# Patient Record
Sex: MALE
[De-identification: names, ages, dates, MRNs, and addresses within clinical notes are randomized per-mention and may not be internally consistent; named-entity substitution may affect disease eponyms.]

---

## 2020-05-05 ENCOUNTER — TELEPHONE (OUTPATIENT)
Dept: SCHEDULING | Age: 70
End: 2020-05-05

## 2020-05-07 RX ORDER — FUROSEMIDE 20 MG/1
TABLET ORAL
Qty: 90 TABLET | Refills: 0 | OUTPATIENT
Start: 2020-05-07

## 2020-10-25 ENCOUNTER — NURSE ONLY (OUTPATIENT)
Dept: LAB | Age: 70
End: 2020-10-25
Attending: NURSE PRACTITIONER
Payer: MEDICARE

## 2020-10-25 DIAGNOSIS — C71.9 BRAIN CANCER (HCC): ICD-10-CM

## 2020-10-25 DIAGNOSIS — R56.9 SEIZURES (HCC): ICD-10-CM

## 2020-10-25 DIAGNOSIS — R41.9: Primary | ICD-10-CM

## 2020-10-25 PROCEDURE — 85025 COMPLETE CBC W/AUTO DIFF WBC: CPT

## 2020-10-25 PROCEDURE — 80053 COMPREHEN METABOLIC PANEL: CPT

## 2020-10-25 PROCEDURE — 80177 DRUG SCRN QUAN LEVETIRACETAM: CPT

## 2020-10-25 PROCEDURE — 36415 COLL VENOUS BLD VENIPUNCTURE: CPT

## 2020-10-28 ENCOUNTER — NURSE ONLY (OUTPATIENT)
Dept: LAB | Age: 70
End: 2020-10-28
Attending: INTERNAL MEDICINE
Payer: MEDICARE

## 2020-10-28 DIAGNOSIS — J96.01 ACUTE RESPIRATORY FAILURE WITH HYPOXIA (HCC): Primary | ICD-10-CM

## 2020-10-28 PROCEDURE — 36415 COLL VENOUS BLD VENIPUNCTURE: CPT

## 2020-10-28 PROCEDURE — 85025 COMPLETE CBC W/AUTO DIFF WBC: CPT

## 2020-10-28 PROCEDURE — 80053 COMPREHEN METABOLIC PANEL: CPT

## 2020-11-02 ENCOUNTER — NURSE ONLY (OUTPATIENT)
Dept: LAB | Age: 70
End: 2020-11-02
Attending: INTERNAL MEDICINE
Payer: MEDICARE

## 2020-11-02 DIAGNOSIS — R52 PAIN: Primary | ICD-10-CM

## 2020-11-02 PROCEDURE — 85025 COMPLETE CBC W/AUTO DIFF WBC: CPT

## 2020-11-02 PROCEDURE — 36415 COLL VENOUS BLD VENIPUNCTURE: CPT

## 2020-11-02 PROCEDURE — 80053 COMPREHEN METABOLIC PANEL: CPT

## 2020-11-02 PROCEDURE — 82150 ASSAY OF AMYLASE: CPT

## 2020-11-02 PROCEDURE — 83690 ASSAY OF LIPASE: CPT

## 2020-11-04 ENCOUNTER — NURSE ONLY (OUTPATIENT)
Dept: LAB | Age: 70
End: 2020-11-04
Attending: INTERNAL MEDICINE
Payer: MEDICARE

## 2020-11-04 DIAGNOSIS — G93.40 ENCEPHALOPATHY, UNSPECIFIED: Primary | ICD-10-CM

## 2020-11-04 PROCEDURE — 36415 COLL VENOUS BLD VENIPUNCTURE: CPT

## 2020-11-04 PROCEDURE — 85025 COMPLETE CBC W/AUTO DIFF WBC: CPT

## 2020-11-04 PROCEDURE — 80053 COMPREHEN METABOLIC PANEL: CPT

## 2020-11-08 ENCOUNTER — HOSPITAL ENCOUNTER (INPATIENT)
Facility: HOSPITAL | Age: 70
LOS: 6 days | Discharge: HOSPICE | DRG: 698 | End: 2020-11-14
Attending: EMERGENCY MEDICINE | Admitting: INTERNAL MEDICINE
Payer: MEDICARE

## 2020-11-08 ENCOUNTER — APPOINTMENT (OUTPATIENT)
Dept: CT IMAGING | Facility: HOSPITAL | Age: 70
DRG: 698 | End: 2020-11-08
Attending: EMERGENCY MEDICINE
Payer: MEDICARE

## 2020-11-08 ENCOUNTER — APPOINTMENT (OUTPATIENT)
Dept: GENERAL RADIOLOGY | Facility: HOSPITAL | Age: 70
DRG: 698 | End: 2020-11-08
Attending: EMERGENCY MEDICINE
Payer: MEDICARE

## 2020-11-08 DIAGNOSIS — M86.9 OSTEOMYELITIS, UNSPECIFIED SITE, UNSPECIFIED TYPE (HCC): ICD-10-CM

## 2020-11-08 DIAGNOSIS — L89.154 DECUBITUS ULCER OF SACRAL REGION, STAGE 4 (HCC): Primary | ICD-10-CM
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
## 2020-11-08 PROCEDURE — 70450 CT HEAD/BRAIN W/O DYE: CPT | Performed by: EMERGENCY MEDICINE

## 2020-11-08 PROCEDURE — 99223 1ST HOSP IP/OBS HIGH 75: CPT | Performed by: INTERNAL MEDICINE

## 2020-11-08 PROCEDURE — 72125 CT NECK SPINE W/O DYE: CPT | Performed by: EMERGENCY MEDICINE

## 2020-11-08 PROCEDURE — 71045 X-RAY EXAM CHEST 1 VIEW: CPT | Performed by: EMERGENCY MEDICINE

## 2020-11-08 PROCEDURE — 71275 CT ANGIOGRAPHY CHEST: CPT | Performed by: EMERGENCY MEDICINE

## 2020-11-08 PROCEDURE — 74177 CT ABD & PELVIS W/CONTRAST: CPT | Performed by: EMERGENCY MEDICINE

## 2020-11-08 RX ORDER — LEVETIRACETAM 500 MG/1
1500 TABLET ORAL 2 TIMES DAILY
Status: DISCONTINUED | OUTPATIENT
Start: 2020-11-08 | End: 2020-11-14

## 2020-11-08 RX ORDER — CLOBAZAM 10 MG/1
5 TABLET ORAL NIGHTLY
Status: DISCONTINUED | OUTPATIENT
Start: 2020-11-08 | End: 2020-11-14

## 2020-11-08 RX ORDER — TERAZOSIN 2 MG/1
2 CAPSULE ORAL NIGHTLY
Status: DISCONTINUED | OUTPATIENT
Start: 2020-11-08 | End: 2020-11-14

## 2020-11-08 RX ORDER — HYDRALAZINE HYDROCHLORIDE 10 MG/1
10 TABLET, FILM COATED ORAL 4 TIMES DAILY
COMMUNITY

## 2020-11-08 RX ORDER — ONDANSETRON 4 MG/1
4 TABLET, FILM COATED ORAL EVERY 8 HOURS PRN
COMMUNITY

## 2020-11-08 RX ORDER — LEVETIRACETAM 1000 MG/1
1500 TABLET ORAL 2 TIMES DAILY
COMMUNITY

## 2020-11-08 RX ORDER — MORPHINE SULFATE 2 MG/ML
2 INJECTION, SOLUTION INTRAMUSCULAR; INTRAVENOUS EVERY 2 HOUR PRN
Status: DISCONTINUED | OUTPATIENT
Start: 2020-11-08 | End: 2020-11-14

## 2020-11-08 RX ORDER — VANCOMYCIN HYDROCHLORIDE
15 EVERY 12 HOURS
Status: DISCONTINUED | OUTPATIENT
Start: 2020-11-09 | End: 2020-11-14

## 2020-11-08 RX ORDER — MORPHINE SULFATE 2 MG/ML
2 INJECTION, SOLUTION INTRAMUSCULAR; INTRAVENOUS EVERY 30 MIN PRN
Status: DISCONTINUED | OUTPATIENT
Start: 2020-11-08 | End: 2020-11-14

## 2020-11-08 RX ORDER — DEXTROSE MONOHYDRATE 25 G/50ML
50 INJECTION, SOLUTION INTRAVENOUS
Status: DISCONTINUED | OUTPATIENT
Start: 2020-11-08 | End: 2020-11-14

## 2020-11-08 RX ORDER — LORAZEPAM 2 MG/ML
0.5 INJECTION INTRAMUSCULAR
Status: DISCONTINUED | OUTPATIENT
Start: 2020-11-08 | End: 2020-11-14

## 2020-11-08 RX ORDER — IPRATROPIUM BROMIDE 21 UG/1
2 SPRAY, METERED NASAL EVERY 12 HOURS
COMMUNITY

## 2020-11-08 RX ORDER — METHION/INOS/CHOL BT/B COM/LIV 110MG-86MG
100 CAPSULE ORAL DAILY
COMMUNITY

## 2020-11-08 RX ORDER — VANCOMYCIN 2 GRAM/500 ML IN 0.9 % SODIUM CHLORIDE INTRAVENOUS
25 ONCE
Status: COMPLETED | OUTPATIENT
Start: 2020-11-08 | End: 2020-11-09

## 2020-11-08 RX ORDER — ASPIRIN 325 MG
325 TABLET ORAL DAILY
Status: DISCONTINUED | OUTPATIENT
Start: 2020-11-08 | End: 2020-11-14

## 2020-11-08 RX ORDER — MORPHINE SULFATE 2 MG/ML
2 INJECTION, SOLUTION INTRAMUSCULAR; INTRAVENOUS ONCE
Status: COMPLETED | OUTPATIENT
Start: 2020-11-08 | End: 2020-11-08

## 2020-11-08 RX ORDER — ONDANSETRON 2 MG/ML
4 INJECTION INTRAMUSCULAR; INTRAVENOUS ONCE
Status: COMPLETED | OUTPATIENT
Start: 2020-11-08 | End: 2020-11-08

## 2020-11-08 RX ORDER — HYDRALAZINE HYDROCHLORIDE 10 MG/1
10 TABLET, FILM COATED ORAL 4 TIMES DAILY
Status: DISCONTINUED | OUTPATIENT
Start: 2020-11-08 | End: 2020-11-09

## 2020-11-08 RX ORDER — CLOBAZAM 10 MG/1
5 TABLET ORAL NIGHTLY
COMMUNITY

## 2020-11-08 RX ORDER — ACETAMINOPHEN 325 MG/1
650 TABLET ORAL EVERY 6 HOURS PRN
Status: DISCONTINUED | OUTPATIENT
Start: 2020-11-08 | End: 2020-11-09

## 2020-11-08 RX ORDER — MAGNESIUM HYDROXIDE/ALUMINUM HYDROXICE/SIMETHICONE 120; 1200; 1200 MG/30ML; MG/30ML; MG/30ML
15 SUSPENSION ORAL EVERY 6 HOURS PRN
COMMUNITY

## 2020-11-08 RX ORDER — ENOXAPARIN SODIUM 100 MG/ML
40 INJECTION SUBCUTANEOUS DAILY
COMMUNITY

## 2020-11-08 RX ORDER — ASPIRIN 81 MG/1
325 TABLET, CHEWABLE ORAL DAILY
COMMUNITY

## 2020-11-08 RX ORDER — HYDROCODONE BITARTRATE AND ACETAMINOPHEN 5; 325 MG/1; MG/1
1 TABLET ORAL EVERY 12 HOURS PRN
COMMUNITY

## 2020-11-08 RX ORDER — SODIUM CHLORIDE 9 MG/ML
INJECTION, SOLUTION INTRAVENOUS CONTINUOUS
Status: ACTIVE | OUTPATIENT
Start: 2020-11-08 | End: 2020-11-08

## 2020-11-08 RX ORDER — DEXTROSE AND SODIUM CHLORIDE 5; .9 G/100ML; G/100ML
INJECTION, SOLUTION INTRAVENOUS CONTINUOUS
Status: DISCONTINUED | OUTPATIENT
Start: 2020-11-08 | End: 2020-11-14

## 2020-11-08 RX ORDER — ALBUTEROL SULFATE 2.5 MG/3ML
2.5 SOLUTION RESPIRATORY (INHALATION) EVERY 6 HOURS PRN
COMMUNITY

## 2020-11-08 RX ORDER — ENOXAPARIN SODIUM 100 MG/ML
40 INJECTION SUBCUTANEOUS NIGHTLY
Status: DISCONTINUED | OUTPATIENT
Start: 2020-11-08 | End: 2020-11-14

## 2020-11-08 RX ORDER — ACETAMINOPHEN 325 MG/1
650 TABLET ORAL EVERY 6 HOURS PRN
COMMUNITY

## 2020-11-08 RX ORDER — METRONIDAZOLE 500 MG/100ML
500 INJECTION, SOLUTION INTRAVENOUS ONCE
Status: COMPLETED | OUTPATIENT
Start: 2020-11-08 | End: 2020-11-08

## 2020-11-08 RX ORDER — LORAZEPAM 2 MG/ML
INJECTION INTRAMUSCULAR EVERY 10 MIN PRN
Status: DISCONTINUED | OUTPATIENT
Start: 2020-11-08 | End: 2020-11-14

## 2020-11-08 RX ORDER — DOXAZOSIN 2 MG/1
2 TABLET ORAL NIGHTLY
COMMUNITY

## 2020-11-08 NOTE — ED INITIAL ASSESSMENT (HPI)
Pt found face down at nursing home after unwitnessed fall. Bruise to forehead and bridge of nose. Pt is non verbal,  Mental status is his norm.

## 2020-11-08 NOTE — H&P
[facility]ITAL[facility]  History and Physical     Dalia Daquanin Patient Status:  Emergency    1950 MRN [id number]   Location [facility] Attending Aleyda Jarvis MD   Hosp Day # 0 PCP No primary care provider on file.      Katya mg by mouth nightly., Disp: , Rfl:     •  Enoxaparin Sodium 40 MG/0.4ML Subcutaneous Solution, Inject 40 mg into the skin daily. , Disp: , Rfl:     •  hydrALAzine HCl 10 MG Oral Tab, Take 10 mg by mouth 4 (four) times daily. , Disp: , Rfl:     •  insulin gla 91.0 91.3 92.1   .0* 517.0* 481.0*       Recent Labs   Lab 11/02/20  1030 11/04/20  0736 11/08/20  1223   * 120* 107*   BUN 17 13 15   CREATSERUM 0.47* 0.45* 0.57*   GFRAA 130 133 120   GFRNAA 113 115 104   CA 8.1* 8.7 9.1   ALB 1.9* 2.0* 2.1

## 2020-11-08 NOTE — ED PROVIDER NOTES
Patient Seen in: [facility] Emergency Department      History   Patient presents with:  Fall    Stated Complaint: fall    HPI    Patient is here after a fall, seemingly rolled off his bed while at the [facility].   Patient has a complicated hist 1221]   /79   Pulse 115   Resp 22   Temp 98.8 °F (37.1 °C)   Temp src Temporal   SpO2 100 %   O2 Device Simple mask       Current:/70   Pulse 120   Temp 98.8 °F (37.1 °C) (Temporal)   Resp 20   Wt 72.6 kg   SpO2 100%         Physical Exam    Ge Notable for the following components:    Clarity Urine Cloudy (*)     Blood Urine Large (*)     Protein Urine 100  (*)     Leukocyte Esterase Urine Moderate (*)     WBC Urine 11-20 (*)     RBC URINE >10 (*)     Bacteria Urine 1+ (*)     WBC Clump Present ( results for these tests on the individual orders.    TROPONIN I   LEVETIRACETAM, S   RAINBOW DRAW BLUE   RAINBOW DRAW LAVENDER   RAINBOW DRAW LIGHT GREEN   RAINBOW DRAW GOLD   BLOOD CULTURE   BLOOD CULTURE   SPUTUM CULTURE   URINE CULTURE, ROUTINE   SARS-CO osteomyelitis into the sacrum and stranding anterior to the sacrum involving distal descending and rectosigmoid colon. Soft tissue stranding in the presacral space extends to abut the sacrum and coccyx.   It is not clear whether all of these are continuous

## 2020-11-08 NOTE — ED NOTES
Pt's daughter called. States pt has terminal brain cancer,  Surgery a few months ago,  Placed in medically induced coma for a month, then was unresponsive for a month,  Began moving lt side, sent to [facility] 2 weeks ago.    Pt has been wrestless

## 2020-11-09 PROCEDURE — 99223 1ST HOSP IP/OBS HIGH 75: CPT | Performed by: OTHER

## 2020-11-09 PROCEDURE — 99223 1ST HOSP IP/OBS HIGH 75: CPT | Performed by: COLON & RECTAL SURGERY

## 2020-11-09 PROCEDURE — 99221 1ST HOSP IP/OBS SF/LOW 40: CPT | Performed by: INTERNAL MEDICINE

## 2020-11-09 RX ORDER — ALBUTEROL SULFATE 90 UG/1
4 AEROSOL, METERED RESPIRATORY (INHALATION) 4 TIMES DAILY
Status: DISCONTINUED | OUTPATIENT
Start: 2020-11-09 | End: 2020-11-10

## 2020-11-09 RX ORDER — MELATONIN
100 DAILY
Status: DISCONTINUED | OUTPATIENT
Start: 2020-11-09 | End: 2020-11-14

## 2020-11-09 RX ORDER — IPRATROPIUM BROMIDE 21 UG/1
2 SPRAY, METERED NASAL EVERY 12 HOURS SCHEDULED
Status: DISCONTINUED | OUTPATIENT
Start: 2020-11-09 | End: 2020-11-14

## 2020-11-09 RX ORDER — ONDANSETRON 4 MG/1
4 TABLET, FILM COATED ORAL EVERY 8 HOURS PRN
Status: DISCONTINUED | OUTPATIENT
Start: 2020-11-09 | End: 2020-11-14

## 2020-11-09 RX ORDER — METRONIDAZOLE 500 MG/100ML
500 INJECTION, SOLUTION INTRAVENOUS EVERY 8 HOURS
Status: DISCONTINUED | OUTPATIENT
Start: 2020-11-09 | End: 2020-11-10

## 2020-11-09 RX ORDER — HYDROCODONE BITARTRATE AND ACETAMINOPHEN 5; 325 MG/1; MG/1
1 TABLET ORAL EVERY 12 HOURS PRN
Status: DISCONTINUED | OUTPATIENT
Start: 2020-11-09 | End: 2020-11-14

## 2020-11-09 RX ORDER — ACETAMINOPHEN 325 MG/1
650 TABLET ORAL EVERY 6 HOURS PRN
Status: DISCONTINUED | OUTPATIENT
Start: 2020-11-09 | End: 2020-11-14

## 2020-11-09 RX ORDER — METRONIDAZOLE 500 MG/100ML
500 INJECTION, SOLUTION INTRAVENOUS EVERY 8 HOURS
Status: DISCONTINUED | OUTPATIENT
Start: 2020-11-09 | End: 2020-11-09

## 2020-11-09 RX ORDER — MAGNESIUM HYDROXIDE/ALUMINUM HYDROXICE/SIMETHICONE 120; 1200; 1200 MG/30ML; MG/30ML; MG/30ML
15 SUSPENSION ORAL EVERY 6 HOURS PRN
Status: DISCONTINUED | OUTPATIENT
Start: 2020-11-09 | End: 2020-11-14

## 2020-11-09 NOTE — PLAN OF CARE
Pt A&Ox1, has been nonverbal for the past three months but was able to verbalize today for a while and speak to his daughter on the phone. Sats > 99% on 6L via a trach mask with FiO2 @ 35%. Pt has a lot of wet thick secretions. NSR on tele. VSS.  R upper co additional Care Plan goals for specific interventions  Outcome: Progressing     Problem: CARDIOVASCULAR - ADULT  Goal: Absence of cardiac arrhythmias or at baseline  Description: INTERVENTIONS:  - Continuous cardiac monitoring, monitor vital signs, obtain as appropriate  - Assist with meals as needed  - Monitor I&O, WT and lab values  - Obtain nutritional consult as needed  - Optimize oral hygiene and moisture  - Encourage food from home; allow for food preferences  - Enhance eating environment  Outcome: Pr interventions for patient's volume status, including labs, urine output, blood pressure (other measures as available)  - Encourage oral intake as appropriate  - Instruct patient on fluid and nutrition restrictions as appropriate  Outcome: Progressing     P

## 2020-11-09 NOTE — CONSULTS
[facility]  Report of  Surgical Consultation with History and Physical Exam    Carmen Law Patient Status:  Inpatient    1950 MRN [id number]   [facility] 5NW-A Attending Nicole Morales MD   Hosp Day # 1 PCP No primary care provid UNKNOWN  Semaglutide             UNKNOWN  Sulfa Antibiotics       UNKNOWN  Trimethoprim            UNKNOWN    Medications:    Current Facility-Administered Medications:   •  LORazepam (ATIVAN) injection 0.5-1 mg, 0.5-1 mg, Intravenous, Q10 Min PRN  •  Maggi Lewis 111/63, pulse 113, temperature 99 °F (37.2 °C), temperature source Oral, resp. rate 18, height 71\", weight 188 lb 14.4 oz (85.7 kg), SpO2 100 %. Due to current COVID restrictions and need for N95, patient not evaluated at this time.  Photo of ulcer revi the physician assistant. I agree with her physical exam and the data listed in the report, and I have made any relevant changes in editing her note. I have personally examined the patient and reviewed all relevant labs and reports.  I agree with the above a

## 2020-11-09 NOTE — DIETARY NOTE
[facility]     NUTRITION INITIAL ASSESSMENT     Pt does not meet malnutrition criteria.      NUTRITION DIAGNOSIS/PROBLEM:     Increased nutrient needs related to increased demand for healing as evidenced by pressure ulcer to sacrum    Inadequate oral FOLLOW-UP DATE:11/13     Narendra Acuña RD, LDN  Pager [phone]

## 2020-11-09 NOTE — H&P
[facility]    History & Physical    Hermila Payton Patient Status:  Inpatient    1950 MRN [id number]   [facility] 5NW-A Attending April High MD   Hosp Day # 1 PCP No primary care provider on file.      Date:  2020  Date of A Former Smoker      Smokeless tobacco: Never Used    Alcohol use: Not Currently    Drug use: Not Currently    Allergies/Medications:    Allergies:   Amoxicillin             UNKNOWN  Gabapentin              UNKNOWN  Pregabalin              UNKNOWN  Semaglutid HYDROcodone-acetaminophen 5-325 MG Oral Tab, Take 1 tablet by mouth every 12 (twelve) hours as needed for Pain. •  Ondansetron HCl (ZOFRAN) 4 mg tablet, 4 mg by Per G Tube route every 8 (eight) hours as needed for Nausea.     •  Thiamine HCl (B-1) 100 MG CO2 22.0 11/09/2020    GLU 93 11/09/2020    CA 8.6 11/09/2020    ALB 2.1 (L) 11/08/2020    ALKPHO 93 11/08/2020    BILT 0.2 11/08/2020    TP 7.4 11/08/2020    AST 21 11/08/2020    ALT 18 11/08/2020    INR 1.07 09/16/2020    TSH 0.785 11/09/2020    OZZIE 5 Postsurgical changes of median sternotomy. Heart size and pulmonary vascularity is within normal limits. Tracheostomy tip is 4.2 cm above the lynda. PICC line tip SVC.     Dictated by (CST): Grant Amin MD on 11/08/2020 at 3:21 PM     Finalized by RONALD information is transmitted to the ACR ([facility] [employer] [facility]) NRDR ([address]) which includes the Dose Index Registry.   PATIENT STATED HISTORY: (As transcribed by Technologist)  Patient was found face down at nursing home aft clinical concern persists MRI with contrast would be helpful for further evaluation. Case discussed with Dr. Herlinda higgins on 11/8/2020 at 1630 hours. .   Dictated by (CST): Kaleb Parker MD on 11/08/2020 at 4:36 PM     Finalized by (CST): Kaleb Parker MD o radiograph was obtained. COMPARISON:  None. INDICATIONS:  fall  PATIENT STATED HISTORY: (As transcribed by Technologist)  Patient offered no additional history at this time.                CONCLUSION:  Slight elevation left hemidiaphragm with slight infil arteries. ABDOMEN/PELVIS: LIVER:  Homogeneous enhancement. BILIARY:  No biliary ductal dilatation. PANCREAS:  Homogeneous enhancement. SPLEEN:  Normal caliber. KIDNEYS:  No hydronephrosis . Nonobstructive right nephrolithiasis.   Mild perinephric strandin osteomyelitis cannot be excluded. Wall thickening of the urinary bladder which is partially decompressed from Taylor catheter. Recommend clinical correlation to exclude cystitis.   Dictated by (CST): Trace Wyman MD on 11/08/2020 at 4:42 PM     Finalized feeding      **Certification    Bacteremia–follow cultures, IV antibiotics, ID follow-up      Chronic respiratory failure status post tracheostomy–oxygenation, trach care, respiratory therapy and nursing to follow, pulmonary follow-up      PHYSICIAN Certif

## 2020-11-09 NOTE — CONSULTS
INFECTIOUS DISEASE CONSULT NOTE    Kimberly Nelson Patient Status:  Inpatient    1950 MRN [id number]   [facility] 5NW-A Attending Kiarra Rodriguez MD   Hosp Day # 1 PCP No primary care mg, Oral, Daily  •  cloBAZam (ONFI) tab 5 mg, 5 mg, Oral, Nightly  •  Terazosin HCl (HYTRIN) cap 2 mg, 2 mg, Oral, Nightly  •  insulin detemir (LEVEMIR) 100 UNIT/ML flextouch 15 Units, 15 Units, Subcutaneous, Q12H  •  lacosamide (VIMPAT) solution SOLN 200 RRR, no m/r/g  Abdomen: Soft, nontender, nondistended. Ostomy and PEG in place   Musculoskeletal: No edema noted  Integument: exam reviewed via imaging, extensive sacral wound with necrotic tissue.   PICC in place    Laboratory Data:    Recent Labs   Lab 1

## 2020-11-09 NOTE — CONSULTS
[id number] [facility] with [facility]  11/9/2020    3:11 PM      REASON FOR CONSULTATION:    Seizure disorder    HISTORY OF PRESENT ILLNESS:    This is a 79year old male who neurology has been consulted for Opal Bajwa Reported    Medication Doxazosin Mesylate 2 MG Oral Tab, Sig 2 mg by Per G Tube route nightly.  , Start Date , End Date , Taking?  Yes, Authorizing Provider External/Patient, Reported    Medication Enoxaparin Sodium 40 MG/0.4ML Subcutaneous Solution, Sig In Reported    Medication bacitracin 500 UNIT/GM External Ointment, Sig Apply 1 Application topically 3 (three) times daily. , Start Date , End Date , Taking?  Yes, Authorizing Provider External/Patient, Reported    Medication Calcium Carbonate Antacid 600 MG O Tube, Q6H PRN    •  cholecalciferol (VITAMIN D3) cap/tab 4,000 Units, 4,000 Units, Per G Tube, Daily    •  Ipratropium Bromide (ATROVENT) 0.03 % nasal spray 2 spray, 2 spray, Nasal, Q12H    •  Alum & Mag Hydroxide-Simeth (MAALOX) oral suspension 15 mL, 15 sodium chloride 0.9% 100 mL MBP/add-vantage, 1 g, Intravenous, Q8H    •  Insulin Aspart Pen (NOVOLOG) 100 UNIT/ML flexpen 2-10 Units, 2-10 Units, Subcutaneous, TID CC and HS    •  vancomycin IVPB premix 1.25g in 0.9% NaCl 250 mL, 15 mg/kg, Intravenous, Q12 further evaluation. Impression  Seizure disorder history  He is post left parietal craniotomy for brain cancer at [facility] in 8/2020. He seems stable on on vimpat 200 mg BID, Onfi 5 mg nightly  and keppra 1500 mg BID.      Plan  Seizure precautions

## 2020-11-09 NOTE — CONSULTS
[address] [facility] Consult Note  [facility]  Report of Consultation    Rigo Mcdonnell Patient Status:  Inpatient    1950 MRN [id number]   [facility] 5NW-A Attending Kanu Valenzuela MD   [address] Day # 1 PCP Units Subcutaneous Q12H   • lacosamide  200 mg Gastric Tube BID   • levETIRAcetam  1,500 mg Oral BID   • omeprazole  20 mg Per G Tube Daily   • enoxaparin  40 mg Subcutaneous Nightly   • cefepime  1 g Intravenous Q8H   • Insulin Aspart Pen  2-10 Units Subc SpO2 Height Weight   11/09/20 1259 — — — 110 20 100 % — —   11/09/20 1151 106/68 98.9 °F (37.2 °C) Axillary 112 22 100 % — —   11/09/20 0851 — — — 113 18 100 % — —   11/09/20 0822 111/63 99 °F (37.2 °C) Oral 104 20 100 % — —   11/09/20 0700 — — — 95 — 100 0.45* 0.57* 0.37*  0.37*   GFRAA 133 120 144  144   GFRNAA 115 104 124  124   CA 8.7 9.1 8.6    137 139   K 4.0 4.5 4.1    107 112   CO2 28.0 26.0 22.0     Recent Labs   Lab 11/04/20  0736 11/08/20  1223 11/09/20  0623   RBC 3.56* 4.16 3.69* Lab 11/08/20  1402   COLORUR Yellow   CLARITY Cloudy*   SPECGRAVITY 1.019   GLUUR Negative   BILUR Negative   KETUR Negative   BLOODURINE Large*   PHURINE 8.0   PROUR 100 *   UROBILINOGEN <2.0   NITRITE Negative   LEUUR Moderate*   WBCUR 11-20*   RBCUR > normal limits. Tracheostomy tip is 4.2 cm above the lynda. PICC line tip SVC.     Dictated by (CST): Nathan Goodman MD on 11/08/2020 at 3:21 PM     Finalized by (CST): Nathan Goodman MD on 11/08/2020 at 3:22 PM       Cta Chest + Ct Abd (w) + Ct Pel (w) S chronic pain\"  · May consider trach cap once fio2 requirement is at 2 lpm  · Send rapid sars cov2  · Check LE dopplers given elevated d dimer and elevated left hemidiaphragm  · On cefepime for bacteremia and UTI  · dtr reports pain at rectum is chronic du

## 2020-11-09 NOTE — CM/SW NOTE
Sent clinical updates to [facility] [place] [id number]. PC consulted.  Will need to confirm dc plan w/pt's HCPOA

## 2020-11-09 NOTE — CONSULTS
[facility]  Report of Inpatient Wound Care Consultation     Chris Macedo Patient Status:  Inpatient    1950 MRN [id number]   [facility] 5NW-A Attending Satnam Beltran MD   Hosp Day # 1 PCP No primary care provider on file.      R evaluate and treat. \"  Patient with chronic Stage IV sacral wound, present on admission, measures at (8.0cm x 8.4cm x 4.5cm) with exposed bone, subcutaneous tissue, periwound dry, no s/s of infection, clean wound, moderate serosanguinous drainage appreciat

## 2020-11-09 NOTE — PROGRESS NOTES
Chart reviewed, Dr. Kurt Marin listed as patient's primary physician. Will transfer care to Dr. Henry Perez.   Maryjane BAEZ

## 2020-11-09 NOTE — CONSULTS
[address]  [id number]  Hospital Day #1  Date of Consult: 11/09/20  Patient seen at: [facility] [id number]    Reason for Consultation:      Consult requested by Dr Juana Torrez for evaluation of palliativ control secretions at this time.     Past Medical History/ Past Surgical history:     Past Medical History:   Diagnosis Date   • Anxiety    • Cancer ([facility]    • Diabetes ([facility]    • Encephalopathy    • Epilepsy ([facility]    • Essential hypertension    • Hyperlipi tab 325 mg, 325 mg, Oral, Daily  •  cloBAZam (ONFI) tab 5 mg, 5 mg, Oral, Nightly  •  Terazosin HCl (HYTRIN) cap 2 mg, 2 mg, Oral, Nightly  •  insulin detemir (LEVEMIR) 100 UNIT/ML flextouch 15 Units, 15 Units, Subcutaneous, Q12H  •  lacosamide (VIMPAT) so 11/09/2020    ALB 2.1 (L) 11/08/2020    ALKPHO 93 11/08/2020    BILT 0.2 11/08/2020    TP 7.4 11/08/2020    AST 21 11/08/2020    ALT 18 11/08/2020    DDIMER 1.63 (H) 11/08/2020    TROP <0.045 11/08/2020       Albumin (g/dL)   Date Value   11/08/2020 2.1 (L median sternotomy. Heart size and pulmonary vascularity is within normal limits. Tracheostomy tip is 4.2 cm above the lynda. PICC line tip SVC.     Dictated by (CST): Isidro Lockhart MD on 11/08/2020 at 3:21 PM     Finalized by (CST): Isidro Lockhart MD o above details.     Palliative Performance Scale:     Palliative Performance Scale   % Ambulation Activity Level Self-Care Intake Consciousness   100 Full Normal Full   Normal Full   90 Full No disease  Normal Full Normal Full   80 Full Some disease  Normal Symptoms management. No new medications from our palliative care services       4. Disposition: TBD    - Discussed today’s visit with floor RN. Thank you for allowing Palliative Care services to participate in the care of Brandi Morocho.     A total of 30 minute

## 2020-11-09 NOTE — PROGRESS NOTES
[facility] Dosing Service    Initial Pharmacokinetic Consult for Vancomycin Dosing     Julia Pace is a 79year old patient who is being treated for cellulitis/osteomyelitis.   Pharmacy has been asked to dose Vancomycin by Dr. Carmen Dixon

## 2020-11-09 NOTE — PLAN OF CARE
11/8/20 2100 Received pt from ER. He is restless. Awake. Follows some simple commands. On trach collar @ 35% 6L O2. O2 sat mid 90's. No signs of distress. He is ST on tele. 's . R PICC port clotted.  RUE- w/ swelling w/ baseline paralysis due to hx of factors to confusion (electrolyte disturbances, delirium, medications)  - Discontinue any unnecessary medical devices as soon as possible  - Assess the patient's physical comfort, circulation, skin condition, hydration, nutrition and elimination needs   - collaborating with pharmacy to review patient's medication profile  Outcome: Progressing  Goal: Maintains adequate nutritional intake (undernourished)  Description: INTERVENTIONS:  - Monitor percentage of each meal consumed  - Identify factors contributing function maintained  Description: INTERVENTIONS:  - Monitor labs and assess for signs and symptoms of volume excess or deficit  - Monitor intake, output and patient weight  - Monitor urine specific gravity, serum osmolarity and serum sodium as indicated or

## 2020-11-09 NOTE — DIETARY NOTE
Enteral Nutrition    Recommend Glucerna 1.2 @ 45ml/hr. Advance 10ml q4 to goal 95ml/hr x 20h.      If no IVF, recommend free water flush of 110 ml q 6hours or per MD.     Goal TF (Gluceran 1.2 at 95 ml/hr x 20 hrs) to provide: 1900 ml, 2280 kcal, 114 grams

## 2020-11-09 NOTE — PROGRESS NOTES
[address] [facility] Preliminary Note    Khris Bang Patient Status:  Inpatient    1950 MRN [id number]   [facility] 5NW-A Attending Daija Cope MD   Hosp Day # 1 PCP No primary care provider on file.      REASON FOR CONS Oral Tab, Sig 5 mg nightly. Per g-tube , Start Date , End Date , Taking? Yes, Authorizing Provider External/Patient, Reported    Medication Doxazosin Mesylate 2 MG Oral Tab, Sig 2 mg by Per G Tube route nightly.  , Start Date , End Date , Taking?  Yes, Auth as needed for Wheezing., Start Date , End Date , Taking? Yes, Authorizing Provider External/Patient, Reported    Medication bacitracin 500 UNIT/GM External Ointment, Sig Apply 1 Application topically 3 (three) times daily. , Start Date , End Date , Taking? IVPB premix 500 mg, 500 mg, Intravenous, Q8H    •  acetaminophen (TYLENOL) tab 650 mg, 650 mg, Per G Tube, Q6H PRN    •  cholecalciferol (VITAMIN D3) cap/tab 4,000 Units, 4,000 Units, Per G Tube, Daily    •  Ipratropium Bromide (ATROVENT) 0.03 % nasal spra sulfate (PF) 2 MG/ML injection 2 mg, 2 mg, Intravenous, Q30 Min PRN    •  Cefepime HCl (MAXIPIME) 1 g in sodium chloride 0.9% 100 mL MBP/add-vantage, 1 g, Intravenous, Q8H    •  Insulin Aspart Pen (NOVOLOG) 100 UNIT/ML flexpen 2-10 Units, 2-10 Units, Subcu Recurrent/residual tumor cannot be excluded. If clinical concern persists MRI with contrast would be helpful for further evaluation. Impression  This is a 79year old male who neurology has been consulted for seizure disorder.   He reportedly developed

## 2020-11-09 NOTE — VASCULAR ACCESS
Spoke with nurse concerning existing PICC line. Pt came to [facility] from outside with existing PICC line. Recent CXR shows PICC in SVC. Nurse plans to insert alteplase to ports as ordered since both ports are without blood return and unable to flush.

## 2020-11-10 ENCOUNTER — APPOINTMENT (OUTPATIENT)
Dept: ULTRASOUND IMAGING | Facility: HOSPITAL | Age: 70
DRG: 698 | End: 2020-11-10
Attending: HOSPITALIST
Payer: MEDICARE

## 2020-11-10 PROCEDURE — 93970 EXTREMITY STUDY: CPT | Performed by: HOSPITALIST

## 2020-11-10 PROCEDURE — 99233 SBSQ HOSP IP/OBS HIGH 50: CPT | Performed by: NURSE PRACTITIONER

## 2020-11-10 RX ORDER — IPRATROPIUM BROMIDE AND ALBUTEROL SULFATE 2.5; .5 MG/3ML; MG/3ML
3 SOLUTION RESPIRATORY (INHALATION)
Status: DISCONTINUED | OUTPATIENT
Start: 2020-11-11 | End: 2020-11-14

## 2020-11-10 NOTE — VASCULAR ACCESS
VASCULAR NOTE:  Consult to vascular team for existing PICC. Xray indicates PICC in SVC. Went to patient's bedside with Bassam Steel RN.   Demonstrated and explained rational for completing a sterile dressing change on arrival to hospital with existing

## 2020-11-10 NOTE — PROGRESS NOTES
[address] [facility] [address] Follow Up     Karen Villasenor  [id number]  Hospital Day #2  Date of Consult: 11/09/20  Patient seen at: [facility]     Subjective:       For purposes of responsible PPE use during COVID-19 pandemic,the followin mg, Oral, Nightly  •  Terazosin HCl (HYTRIN) cap 2 mg, 2 mg, Oral, Nightly  •  insulin detemir (LEVEMIR) 100 UNIT/ML flextouch 15 Units, 15 Units, Subcutaneous, Q12H  •  lacosamide (VIMPAT) solution SOLN 200 mg, 200 mg, Gastric Tube, BID  •  levETIRAcetam TROP <0.045 11/08/2020       COVID-19 Lab Results     COVID-19  Lab Results   Component Value Date    COVID19 Not Detected 11/08/2020        Pro-Calcitonin  Recent Labs   Lab 11/08/20  1223   PCT 0.06        Cardiac  Recent Labs   Lab 11/08/20  1223 11/ 11/8/2020  CONCLUSION:  Slight elevation left hemidiaphragm with slight infiltrate versus atelectasis left lung base. Postsurgical changes of median sternotomy. Heart size and pulmonary vascularity is within normal limits.   Tracheostomy tip is 4.2 cm abo Self-Care Intake Consciousness   100 Full Normal Full   Normal Full   90 Full No disease  Normal Full Normal Full   80 Full Some disease  Normal w/effort Full Normal or  Reduced Full   70 Reduced Some disease  Can’t perform job Full Normal or   Reduced Ful epilepticus. She reported he was on propofol and 30 mg of Versed, \"a large dose of medication for two weeks. \" Once he was weaned from the sedation he was not waking up. She shared they had to make decisions about DNR vs treatment with trach and PEG.  They sacral region, stage 4 (HCC)    Fall    Osteomyelitis, unspecified site, unspecified type ([address]    Seizure disorder ([facility]    Goals of care    Palliative care      Palliative Care Recommendations/Plan:     1.  Ongoing goals of care: Continue ongoing medical

## 2020-11-10 NOTE — PROGRESS NOTES
[facility] Pharmacy Note:  Renal Adjustment for meropenem (MERREM)    Hermila Payton is a 79year old patient who has been prescribed meropenem (MERREM) 500 mg every 6 hrs. CrCl is estimated creatinine clearance is 192.7 mL/min (A) (based on SCr of 0.38 mg/dL (L)).

## 2020-11-10 NOTE — PROGRESS NOTES
[facility]  Progress Note    [place] Alert Patient Status:  Inpatient    1950 MRN [id number]   [facility] 5NW-A Attending Sonia Merino MD   Hosp Day # 2 PCP No primary care provider on file.      Subjective:  [place] Alert is a(n) 7 Corynebacterium  Wound culture multibacterial reviewed and noted  Urine with Pseudomonas  1 of 2+ blood cultures with staph nonaureus suspected contaminant  Covid rapid is negative    Radiology:  No results found.   CTA chest abdomen reviewed mild atelectas

## 2020-11-10 NOTE — PROGRESS NOTES
11/10/20 0700   Provider Notification   Reason for Communication Review case   Test/Procedure Name elevated HR   Provider Name Other (comment)  Patricia Covert)   Method of Communication Page   Response Waiting for response   Notification Time [phone]

## 2020-11-10 NOTE — PLAN OF CARE
Problem: Safety Risk - Non-Violent Restraints  Goal: Patient will remain free from self-harm  Description: INTERVENTIONS:  - Apply the least restrictive restraint to prevent harm  - Notify patient and family of reasons restraints applied  - Assess for an on oxygen saturation or ABGs  - Provide Smoking Cessation handout, if applicable  - Encourage broncho-pulmonary hygiene including cough, deep breathe, Incentive Spirometry  - Assess the need for suctioning and perform as needed  - Assess and instruct to re Blood Glucose as ordered  - Assess for signs and symptoms of hyperglycemia and hypoglycemia  - Administer ordered medications to maintain glucose within target range  - Assess barriers to adequate nutritional intake and initiate nutrition consult as needed highest/safest level of mobility/gait  Description: Interventions:  - Assess patient's functional ability and stability  - Promote increasing activity/tolerance for mobility and gait  - Educate and engage patient/family in tolerated activity level and prec brown urine. R upper colostomy. Pt pulled out the colostomy bag. Cleaned the pt and placed new colostomy bag. Peg tube feeding with Glucerna 1.2 at 45 ml/hr. Residual 60 ml. Q6 ACCU check. IV ABX and IV fluid infusing. Stage 4 sacral wound.  Dressing hubbard

## 2020-11-10 NOTE — PROGRESS NOTES
[facility]  Progress Note    Leane Solders Patient Status:  Inpatient    1950 MRN [id number]   [facility] 5NW-A Attending Cathleen Booth MD   Hosp Day # 2 PCP No primary care provider on file.          SUBJECTIVE:  Subjective:  Edmundo OBJECTIVE:  Temp:  [98.3 °F (36.8 °C)-99.3 °F (37.4 °C)] 99.3 °F (37.4 °C)  Pulse:  [] 126  Resp:  [18-22] 22  BP: ()/(51-75) 113/60  FiO2 (%):  [35 %] 35 %    Intake/Output:    Intake/Output Summary (Last 24 hours) at 11/10/2020 7340 Aerobic Culture Result 4+ growth Pseudomonas aeruginosa (A) N/A    Aerobic Smear 1+ WBCs seen N/A    Aerobic Smear 1+ Gram positive cocci in pairs N/A    Aerobic Smear 1+ Gram Negative Rods N/A    Aerobic Smear 1+ Gram Positive Rods N/A       Susceptibilit Postsurgical changes of left parietal craniotomy with 3.5 mm shallow low-density subdural collection just deep to the craniotomy site most consistent with postsurgical change. INTRACRANIAL:  There is no acute intracranial hemorrhage.   There is a 4.7 x 3.0 (CPT=72125)  COMPARISON:  None. INDICATIONS:  fall  TECHNIQUE:  Noncontrast CT scanning of the cervical spine is performed from the skull base through C7. Multiplanar reconstructions are generated. Dose reduction techniques were used.  Dose information i is within normal limits. Tracheostomy tip is 4.2 cm above the lynda. PICC line tip SVC.     Dictated by (CST): Abad Helms MD on 11/08/2020 at 3:21 PM     Finalized by (CST): Abad Helms MD on 11/08/2020 at 3:22 PM       Cta Chest + Ct Abd (w) + Ct enlarged adenopathy. BOWEL/MESENTERY:  Postsurgical changes involving the colon with colostomy at the mid transverse colon level.   There is wall thickening involving the distal descending and rectosigmoid colon with soft tissue stranding and some free flui Once   • alteplase (CATHFLO) IV push 2 mg to DECLOT line  2 mg Intravenous Once   • metRONIDAZOLE  500 mg Intravenous Q8H   • cholecalciferol  4,000 Units Per G Tube Daily   • Ipratropium Bromide  2 spray Nasal Q12H   • Thiamine HCl  100 mg Per G Tube Siobhan osteomyelitis treated with Zosyn, h/o prolonged intubation and chronic respiratory failure s/p trach/peg placement at [facility] in addition to the other co-morbidities Wound team and general surgery follow           Fall  Monitor       Osteomyelitis, unspecifie

## 2020-11-10 NOTE — PROGRESS NOTES
INFECTIOUS DISEASE PROGRESS NOTE    Rigo Mcdonnell Patient Status:  Inpatient    1950 MRN [id number]   [facility] 5NW-A Attending Kanu Valenzuela MD   Hosp Day # 2 PCP No primary care provider on file. Subjective: no acute events o/n. Continue vancomycin. Change cefepime/metronidazole to meropenem. Final recs pending               - Monitor WBC, fever curve. Daily labs per primary               - Consider [address] discussions.   Defer to primary  # Tracheobronchitis: sputum clx with PSAR  #

## 2020-11-10 NOTE — PROGRESS NOTES
[facility] dosing service    Follow-up Pharmacokinetic Consult for Vancomycin Dosing     Kimberly Nelson is a 79year old patient who is being treated for osteomyelitis. Patient is on day 3 of Vancomycin and is currently receiving vanco 1250 mg q12h.   Goal Status: Abnormal (Preliminary result)    Collection Time: 11/08/20  2:02 PM    Specimen: Urine, clean catch   Result Value Ref Range    Urine Culture >100,000 CFU/ML Pseudomonas aeruginosa (A) N/A       Based on the above:    1.  Continue vanco 1.25 g q12h

## 2020-11-11 ENCOUNTER — APPOINTMENT (OUTPATIENT)
Dept: GENERAL RADIOLOGY | Facility: HOSPITAL | Age: 70
DRG: 698 | End: 2020-11-11
Attending: INTERNAL MEDICINE
Payer: MEDICARE

## 2020-11-11 ENCOUNTER — NURSE ONLY (OUTPATIENT)
Dept: LAB | Age: 70
End: 2020-11-11
Attending: INTERNAL MEDICINE
Payer: MEDICARE

## 2020-11-11 DIAGNOSIS — I82.812 EMBOLISM AND THROMBOSIS OF SUPERFICIAL VEIN OF LEFT LOWER EXTREMITY: Primary | ICD-10-CM

## 2020-11-11 PROCEDURE — 99356 PROLONGED SERV,INPATIENT,1ST HR: CPT | Performed by: NURSE PRACTITIONER

## 2020-11-11 PROCEDURE — 71045 X-RAY EXAM CHEST 1 VIEW: CPT | Performed by: INTERNAL MEDICINE

## 2020-11-11 PROCEDURE — 99233 SBSQ HOSP IP/OBS HIGH 50: CPT | Performed by: NURSE PRACTITIONER

## 2020-11-11 RX ORDER — POTASSIUM CHLORIDE 1.5 G/1.77G
40 POWDER, FOR SOLUTION ORAL EVERY 4 HOURS
Status: COMPLETED | OUTPATIENT
Start: 2020-11-11 | End: 2020-11-11

## 2020-11-11 NOTE — PROGRESS NOTES
INFECTIOUS DISEASE PROGRESS NOTE    Chris Macedo Patient Status:  Inpatient    1950 MRN [id number]   [facility] 2NE-A Attending Satnam Beltran MD   Hosp Day # 3 PCP No primary care provider on file. Subjective: no acute events o/n. via PICC for presumed OM x 6 weeks               - Recommend SNF placement given burden of abx    - Monitor WBC, fever curve.  Daily labs per primary               - Consider GOC discussions.  Defer to primary  # Tracheobronchitis: sputum clx with PSAR  #

## 2020-11-11 NOTE — PLAN OF CARE
Assumed care this am. Pt with trache # 8 shiley, collar changed just prior to shift change this am. D/I. Pt will nod to questions intermittently but does not follow commands. Left wrist restraint, still needed as pt attempts to pick at lines.  Right side fl

## 2020-11-11 NOTE — PROGRESS NOTES
[address] [facility] [address] Follow Up     Mara Syed  [id number]  Hospital Day #3  Date of Consult: 11/09/20  Patient seen at: [facility]:      Patient was seen with Jessie at the bedside.      Ap Morse nodded to commands, Intravenous, Q10 Min PRN  •  LORazepam (ATIVAN) injection 0.5 mg, 0.5 mg, Intravenous, Q15 Min PRN  •  aspirin tab 325 mg, 325 mg, Oral, Daily  •  cloBAZam (ONFI) tab 5 mg, 5 mg, Oral, Nightly  •  Terazosin HCl (HYTRIN) cap 2 mg, 2 mg, Oral, Nightly  •  in 11/11/2020    CA 8.1 (L) 11/11/2020    ALB 1.6 (L) 11/11/2020    ALKPHO 74 11/11/2020    BILT 0.1 11/11/2020    TP 5.5 (L) 11/11/2020    AST 13 (L) 11/11/2020    ALT 13 (L) 11/11/2020    DDIMER 1.63 (H) 11/08/2020    PHOS 1.8 (L) 11/11/2020    TROP <0.045 Signs: /69 (BP Location: Left arm)   Pulse 100   Temp 98.2 °F (36.8 °C) (Axillary)   Resp 19   Ht 5' 11\" (1.803 m)   Wt 188 lb 14.4 oz (85.7 kg)   SpO2 100%   BMI 26.35 kg/m²     General: Appears comfortable, resting supine.  Opens eyes to stimulatio family for a treatment plan that is right for Gaurang Herrera.      Dr. Eloy Ballesteros had met with her prior to our discussion and explained the extent of his wounds, recurrent infections due to the wound and the difficulty going forward for the wounds to be treated or heal. the infection information with her to help her understand potential complications and prognosis going forward. Harman Ferreira wants to do what is best for her Dad, she knows he would not want to live long term in a nursing home. Her ideal wish is to be with him.    I provided to [facility]. - Disposition: ongoing goals of care discussions.     - Discussed today’s visit with Khadar Parnell RN and notes reviewed from Dr. Marjorie Damon.        A total of 60 minutes were spent on this follow up; direct face to face co

## 2020-11-11 NOTE — PLAN OF CARE
Patient alert to self and place  With forgetfulness and confusion, denies any cp/ chest discomfort , plan of care reminded, total care and frequent position provided and made comfortable, all dressing care wound care provided,and reality orienting and trac Monitor electrolytes and administer replacement therapy as ordered  Outcome: Progressing     Problem: RESPIRATORY - ADULT  Goal: Achieves optimal ventilation and oxygenation  Description: INTERVENTIONS:  - Assess for changes in respiratory status  - Assess injections, enemas and rectal medication administration  - Ensure safe mobilization of patient  - Hold pressure on venipuncture sites to achieve adequate hemostasis  - Assess for signs and symptoms of internal bleeding  - Monitor lab trends  - Patient is t

## 2020-11-11 NOTE — PLAN OF CARE
Data: Patient is A/O to self- mostly NV but occasionally calls out for help- periods of restlessness, does not follow commands, seizure precautions in place, L hand tremor, R sided weakness, L hand restraint in place.  Trach collar- O2 sats >100%, pt produc restraint to prevent harm  - Notify patient and family of reasons restraints applied  - Assess for any contributing factors to confusion (electrolyte disturbances, delirium, medications)  - Discontinue any unnecessary medical devices as soon as possible  -

## 2020-11-11 NOTE — PROGRESS NOTES
[facility]  Progress Note    Pamelia Center Backbone Patient Status:  Inpatient    1950 MRN [id number]   [facility] 5NW-A Attending Desmond Junior MD   Hosp Day # 3 PCP No primary care provider on file.          SUBJECTIVE:  Subjective:  Edmundo first choice. I had face-to-face discussion with Dr. Manjeet Thompson with 2 nurses assigned to the patient at side during this discussion. Manjeet Thompson explain me that multiple providers and facilities have previously offered hospice and she had declined.   At this po will use some low dosage judiciously which he agreed upon understanding the risk and benefits  Intake/Output:    Intake/Output Summary (Last 24 hours) at 11/11/2020 1127  Last data filed at 11/11/2020 0849  Gross per 24 hour   Intake 5020 ml   Output 3227 Culture Result 2+ growth Enterococcus species not VRE (A) N/A    Aerobic Culture Result 4+ growth Pseudomonas aeruginosa (A) N/A    Aerobic Smear 1+ WBCs seen N/A    Aerobic Smear 1+ Gram positive cocci in pairs N/A    Aerobic Smear 1+ Gram Negative Rods N Sensitive      Gentamicin 4 Sensitive      Meropenem <=1 Sensitive      Levofloxacin 0.5 Sensitive      Piperacillin + Tazobactam >64 Resistant        Ct Brain Or Head (75175)    Result Date: 11/8/2020  PROCEDURE:  CT BRAIN OR HEAD (99422)  COMPARISON:  No a 4.7 x 3.0 x 3.1 cm area of decreased attenuation involving the left periventricular temporoparietal lobes extending midline to involve the corpus callosum with some localized mass effect. No midline shift.   Per history the patient has had prior tumor re degenerative changes are noted throughout the cervical spine most severe C3-4 and C6-7 with disc osteophyte complex at C6-7.    Dictated by (CST): Abad Helms MD on 11/08/2020 at 4:16 PM     Finalized by (CST): Abad Helms MD on 11/08/2020 at 4:19 PM lower lobe extending to the left hilum. MEDIASTINUM:  Tracheostomy. No enlarged mediastinal or hilar adenopathy. CARDIAC:  No cardiomegaly or significant pericardial effusion. Gonzalo Angles PLEURA:  No pneumothorax or effusion. AORTA:  No aneurysm or dissection.   VA rectosigmoid colon with surrounding inflammatory changes concerning for colitis. There is soft tissue stranding and free fluid abutting the rectosigmoid colon in the presacral space. This extends to abut the sacrum and coccyx.   There is a large posterior type [facility])     Seizure disorder ([facility] [address]     Goals of care, counseling/discussion     Palliative care encounter    Principal Problem:    Decubitus ulcer of sacral region, stage 4 [facility])  Active Problems:    Fall    Osteomyelitis, unspecified site, unspecified thrive–palliative care following for goals of care      Chronic pain–judicious use of morphine keeping in mind the risk and benefits as was discussed with daughter.   Will await palliative and/or hospice discussion outcomes    History of anxiety disorder

## 2020-11-11 NOTE — CM/SW NOTE
Reviewed SW documentation. Noted that pt came from [facility]. Per [facility] messages and PC, family is possibly considering [facility] as family lives up there. Referral was sent in [facility].     Will follow up with daughter on a safe dischar

## 2020-11-11 NOTE — CM/SW NOTE
Spoke with Adilene NP over the phone about a hospice consult with [facility]. Order entered. Referral and supporting documentation sent to [facility] in [address]. Requested that they call pt's daughter, Nathalie Roberson, to coordinate a meeting. Will yan order complete.      [facility]

## 2020-11-11 NOTE — PROGRESS NOTES
[facility]  Progress Note    Mary Carrillo Patient Status:  Inpatient    1950 MRN [id number]   [facility] 2NE-A Attending Cat Awad MD   Hosp Day # 3 PCP No primary care provider on file.      Subjective:  Mary Carrillo is a(n) 7 168 hours.     Recent Labs   Lab 11/08/20  1512   ABGPHT 7.43   APFMYW9U 41   FNAAB4M 153*   ABGHCO3 26.7*   ABGBE 2.2*   TEMP 98.6   RINA Positive   SITE Left Radial   DEV Trach Collar   THGB 10.8*       Invalid input(s): CKTOTAL, TROPONINI, TROPONINT, CK tab 5 mg, 5 mg, Oral, Nightly    •  Terazosin HCl (HYTRIN) cap 2 mg, 2 mg, Oral, Nightly    •  insulin detemir (LEVEMIR) 100 UNIT/ML flextouch 15 Units, 15 Units, Subcutaneous, Q12H    •  lacosamide (VIMPAT) solution SOLN 200 mg, 200 mg, Gastric Tube, BID chronic pain\"  · May consider trach cap once fio2 requirement is at 2 lpm  · Continue meropenem per ID for bacteremia, UTI and wound infection  · dtr reports pain at rectum is chronic due to h/o colorectal cancer  · Evaluated by gen surg for decubitus  ·

## 2020-11-12 PROCEDURE — 99231 SBSQ HOSP IP/OBS SF/LOW 25: CPT | Performed by: NURSE PRACTITIONER

## 2020-11-12 NOTE — PLAN OF CARE
Received patient at 0730. Arousable to verbal stimuli. Tele rhythm NSR. O2 saturation 100% on 35% FIO2 via trach color. Bed is locked in low position. Patient with LUE restraint, per order. Patient with colostomy and donato intact.  TF running at goal. Will oxygenation and minimize respiratory effort  - Oxygen supplementation based on oxygen saturation or ABGs  - Provide Smoking Cessation handout, if applicable  - Encourage broncho-pulmonary hygiene including cough, deep breathe, Incentive Spirometry  - Asses maintained within prescribed range  Description: INTERVENTIONS:  - Monitor Blood Glucose as ordered  - Assess for signs and symptoms of hyperglycemia and hypoglycemia  - Administer ordered medications to maintain glucose within target range  - Assess barri Progressing     Problem: Impaired Functional Mobility  Goal: Achieve highest/safest level of mobility/gait  Description: Interventions:  - Assess patient's functional ability and stability  - Promote increasing activity/tolerance for mobility and gait  - E

## 2020-11-12 NOTE — PROGRESS NOTES
[address] Follow Up     Shania Parkinson  [id number]  Hospital Day #4  Date of Consult: 11/09/20  Patient seen at: [facility]     Subjective:      Patient was seen and examined without family at the bedside.      Elin Pino op mL, Intramuscular, Prior to discharge  •  LORazepam (ATIVAN) injection 0.5-1 mg, 0.5-1 mg, Intravenous, Q10 Min PRN  •  LORazepam (ATIVAN) injection 0.5 mg, 0.5 mg, Intravenous, Q15 Min PRN  •  aspirin tab 325 mg, 325 mg, Oral, Daily  •  cloBAZam (ONFI) ta (H) 11/12/2020    CA 8.1 (L) 11/12/2020    ALB 1.7 (L) 11/12/2020    ALKPHO 81 11/12/2020    BILT 0.1 11/12/2020    TP 5.9 (L) 11/12/2020    AST 21 11/12/2020    ALT 16 11/12/2020    DDIMER 1.63 (H) 11/08/2020    PHOS 1.8 (L) 11/11/2020    TROP <0.045 11/0 113/59 (BP Location: Left arm)   Pulse 96   Temp 99.3 °F (37.4 °C) (Oral)   Resp 16   Ht 5' 11\" (1.803 m)   Wt 188 lb 14.4 oz (85.7 kg)   SpO2 100%   BMI 26.35 kg/m²     General: Appears comfortable with eyes closes supine.    Body habitus: Nourished, PEG Full Code  Code status was discussed yesterday afternoon with daughters. They were not ready to change his code status.      HCPOA surrogate : Aries Cox         Disposition: ongoing goals of care discussions      Problem List       Decubitus ulcer of sacral

## 2020-11-12 NOTE — PROGRESS NOTES
[facility]  Progress Note    Kimberly Nelson Patient Status:  Inpatient    1950 MRN [id number]   [facility] 2NE-A Attending Kiarra Rodriguez MD   Hosp Day # 4 PCP No primary care provider on file.      Subjective:  Kimberly Nelson is a(n) 7 Sputum culture with Pseudomonas Corynebacterium  SARS PCR is negative  Decubitus culture noted with polymicrobials  Follow-up blood cultures negative    Radiology:  No results found.   Chest x-ray is reviewed with ongoing elevation left hemidiaphragm and freed

## 2020-11-12 NOTE — PROGRESS NOTES
[facility]  Progress Note    [place] Patient Status:  Inpatient    1950 MRN [id number]   [facility] 5NW-A Attending Sonu Mar MD   Hosp Day # 4 PCP No primary care provider on file.          SUBJECTIVE:  Subjective:  Edmundo 11/11/20  2230 11/12/20  0606   PGLU 115* 167* 109* 178* 205*       Recent Labs   Lab 11/08/20  1353   BLDSMR Gram positive cocci in [facility] Encounter on 11/08/20   1.  AEROBIC BACTERIAL CULTURE     Status: Abnormal    Collection Time: 11/0 BLOOD CULTURE     Status: None (Preliminary result)    Collection Time: 11/08/20  2:15 PM    Specimen: Blood,peripheral   Result Value Ref Range    Blood Culture Result No Growth 3 Days N/A   3. URINE CULTURE, ROUTINE     Status: Abnormal    Collection Norma Woodward records when and if available. [place] SINUSES:           Small retention cyst in the left maxillary sinus. Mild mucosal thickening paranasal sinuses. MASTOIDS:          Trace fluid in the inferior right mastoid. . SKULL:             Postsurgical changes of lef fracture. Mild retrolisthesis C3 in relation to C4 is likely degenerative. There is preservation of cervical vertebral body height. There is accessory ossification posterior to the C3 through  C7 spinous processes.   Multilevel endplate hypertrophic spur visualization of vascular anatomy. Dose reduction techniques were used. Dose information is transmitted to the ACR [facility] [employer] [facility] [employer]) NRDR ([address]) which includes the Dose Index Registry.   PATIENT STATED HISTORY:(As surrounding soft tissue stranding. Cystitis cannot be excluded. BONES:  Mild degenerative changes in the lower lumbar facets. CONCLUSION:  No gross evidence for pulmonary embolism.   Evaluation of segmental arteries is limited due to motion ar Dextrose-NaCl 75 mL/hr at 11/12/20 0630     acetaminophen, Alum & Mag Hydroxide-Simeth, HYDROcodone-acetaminophen, Ondansetron HCl, influenza virus vaccine PF, LORazepam, LORazepam, glucose **OR** Glucose-Vitamin C **OR** dextrose **OR** glucose **OR** Glu prophylaxis–Lovenox     IDDM with complications–NovoLog sliding scale     GERD–omeprazole     BPH–terazosin     Anemia–anemia of chronic disease, monitor H&H     Dysphagia–G-tube with tube feeding    history of left parietal Stage IV glioblastoma s/p resec ulcer at [facility]. He was then discharged to [facility]. At [facility] he did not progress and suffered rectal bleeding.  This lead him to a hospitalization at [facility] where he underwent a diverting colostomy with hopes his pressure ulcer would heal.  They were told h acute and chronic pain in I advised her on judicious use of morphine and her abdomen.   She initially insisted on giving scheduled doses of morphine but he did verbalize understanding that too much narcotics and benzodiazepines especially with chronic respi

## 2020-11-12 NOTE — CM/SW NOTE
Spoke to representative from [facility], they will reach out to family to establish a meeting. Await call back from [facility] with meeting time.

## 2020-11-12 NOTE — PLAN OF CARE
Patient alert and oriented x2 with period of forgetfulness, plan of care and all care explained , assisted to make comfortable, multiple ivabt and ivf D5NS 75ml/hr infusing, GT site care and donato care provided, colostomy draining good, tube feeding change Care Plan goals for specific interventions  Outcome: Progressing     Problem: CARDIOVASCULAR - ADULT  Goal: Absence of cardiac arrhythmias or at baseline  Description: INTERVENTIONS:  - Continuous cardiac monitoring, monitor vital signs, obtain 12 lead EKG appropriate  - Assist with meals as needed  - Monitor I&O, WT and lab values  - Obtain nutritional consult as needed  - Optimize oral hygiene and moisture  - Encourage food from home; allow for food preferences  - Enhance eating environment  Outcome: Progr document risk factors for pressure ulcer development  - Assess and document skin integrity  - Assess and document dressing/incision, wound bed, drain sites and surrounding tissue  - Implement wound care per orders  - Initiate isolation precautions as appro

## 2020-11-13 RX ORDER — SCOLOPAMINE TRANSDERMAL SYSTEM 1 MG/1
1 PATCH, EXTENDED RELEASE TRANSDERMAL
Status: DISCONTINUED | OUTPATIENT
Start: 2020-11-13 | End: 2020-11-14

## 2020-11-13 NOTE — PLAN OF CARE
Pt much more awake this evening. Asking for [facility]. States \"I hurt\". States pain is \"very bad\". Medicated with morphine. No relief after 1 hr.  Pt appears restless. Yelling out. 0.5mg ativan given. At pt's bedside offering emotional support.  sats an

## 2020-11-13 NOTE — PLAN OF CARE
Patient alert to name and period of confusion and restless,  ,trach color 28% with 4L o2 tolerating good, Sinus on tele , no pain stimuli noted and on and off sleeping with confusion , ivf ,abts , tube feeding infusing , ativan 0.5mg helped to sleep , tota medications as ordered  - Initiate emergency measures for life threatening arrhythmias  - Monitor electrolytes and administer replacement therapy as ordered  Outcome: Progressing     Problem: GASTROINTESTINAL - ADULT  Goal: Maintains or returns to baseline

## 2020-11-13 NOTE — PROGRESS NOTES
[facility]  Progress Note    Hermila Payton Patient Status:  Inpatient    1950 MRN [id number]   [facility] 2NE-A Attending April High MD   Hosp Day # 5 PCP No primary care provider on file.      Subjective:  Hermila Payton is a(n) 7 ALT 18  --  13* 16  --    BILT 0.2  --  0.1 0.1  --    TP 7.4  --  5.5* 5.9*  --     < > = values in this interval not displayed. No results for input(s): MG in the last 168 hours.     Lab Results   Component Value Date    PHOS 1.5 (L) 11/13/2020 packet, 1 packet, Per G Tube, Daily    •  influenza vaccine (PF) (FLUZONE HD) high dose for 65 yrs & older inj 0.7ml, 0.7 mL, Intramuscular, Prior to discharge    •  LORazepam (ATIVAN) injection 0.5-1 mg, 0.5-1 mg, Intravenous, Q10 Min PRN    •  LORazepam debridements s/p transverser loop colostomy 10/15/20 at [facility]  · anxiety and chronic pain hx    Plan:  · O2 requiring at LTAC 6 LPM . Trach collar 28% 4L. Continue suctioning as needed.  Continue bronchodilator  · Attempt to wean fio2 to keep sat >89%  · N

## 2020-11-13 NOTE — PLAN OF CARE
Received patient at 0730. Alert to person; restless (0.5mg ativan x2 given). Right side flaccid. Wrist restraint present per order on LUE. No s/s of injury. 4L O2, FiO2 28% via trach mask; kelley #8. ST on tele. Norco x1  given for pain control.  Colostomy medications as ordered  - Initiate emergency measures for life threatening arrhythmias  - Monitor electrolytes and administer replacement therapy as ordered  Outcome: Progressing     Problem: RESPIRATORY - ADULT  Goal: Achieves optimal ventilation and oxyg retention  Description: INTERVENTIONS:  - Assess patient’s ability to void and empty bladder  - Monitor intake/output and perform bladder scan as needed  - Follow urinary retention protocol/standard of care  - Consider collaborating with pharmacy to review drain site(s) healing without S/S of infection  Description: INTERVENTIONS:  - Assess and document risk factors for pressure ulcer development  - Assess and document skin integrity  - Assess and document dressing/incision, wound bed, drain sites and surrou

## 2020-11-13 NOTE — CM/SW NOTE
Spoke to representative at [facility]. Patients virginia Olivera and Stacey Miller had an info meeting this am. Daughters and Yulia Lomeli are having a signing meeting at 430 pm today. Plan is to go home with [facility].

## 2020-11-13 NOTE — PROGRESS NOTES
[facility]  Progress Note    Chris Macedo Patient Status:  Inpatient    1950 MRN [id number]   [facility] 5NW-A Attending Satnam Beltran MD   Hosp Day # 5 PCP No primary care provider on file.          SUBJECTIVE:  Subjective:  Edmundo   --   --        Recent Labs   Lab 11/12/20  1742 11/12/20  1937 11/13/20  0019 11/13/20  0701 11/13/20  1232   PGLU 165* 175* 156* 137* 183*       Recent Labs   Lab 11/08/20  1353   BLDSMR Gram positive cocci in [facility] Encounter Sensitive      Levofloxacin 0.5 Sensitive      Piperacillin + Tazobactam >64 Resistant    2.  BLOOD CULTURE     Status: None (Preliminary result)    Collection Time: 11/08/20  2:15 PM    Specimen: Blood,peripheral   Result Value Ref Range    Blood Culture R evaluation if clinical concern persists as well as comparison with any outside imaging and records when and if available. Sabula Master SINUSES:           Small retention cyst in the left maxillary sinus. Mild mucosal thickening paranasal sinuses.   MASTOIDS: and bridge of nose. FINDINGS:  There is no CT evidence for acute cervical spine fracture. Mild retrolisthesis C3 in relation to C4 is likely degenerative. There is preservation of cervical vertebral body height.   There is accessory ossification poster intravenous contrast material. Multi-planar reformatted/3-D images were created to optimize visualization of vascular anatomy. Dose reduction techniques were used.  Dose information is transmitted to the [facility]) Juliet Preciado [address] Taylor catheter in a partially decompressed urinary bladder with marked wall thickening and surrounding soft tissue stranding. Cystitis cannot be excluded. BONES:  Mild degenerative changes in the lower lumbar facets.              CONCLUSION:  No gross shauna BID   • omeprazole  20 mg Per G Tube Daily   • enoxaparin  40 mg Subcutaneous Nightly   • vancomycin  15 mg/kg Intravenous Q12H     • Dextrose-NaCl 40 mL/hr at 11/13/20 0023     acetaminophen, Alum & Mag Hydroxide-Simeth, HYDROcodone-acetaminophen, Ondanse 8/11/2020.     Palliative care for goals of care     Seizure disorder–seizure medication, neurology follow-up     DVT prophylaxis–Lovenox     IDDM with complications–NovoLog sliding scale     GERD–omeprazole     BPH–terazosin     Anemia–anemia of chronic d about DNR vs treatment with trach and PEG. They pursued treatment focus with PEG and trach. She reports he had a pressure ulcer at [facility]. He was then discharged to [facility]. At [facility] he did not progress and suffered rectal bleeding.  This lead him to a hospit other multi-speciality's have seen and if they agree with this recommendation    We also had discussion about patient having acute and chronic pain in I advised her on judicious use of morphine and her abdomen.   She initially insisted on giving scheduled d

## 2020-11-13 NOTE — SLP NOTE
PMV (Passy Anderson Valve) EVALUATION - INPATIENT    Admission Date: 11/8/2020  Evaluation Date: 11/13/20    Reason for Referral: Other (Comment)(PMV)    ASSESSMENT & PLAN   ASSESSMENT & IMPRESSION  Order received for patient evaluation for Passy Karmen Valve.  H alertness and required SLP tactile stimulation to remain alert. Patient made no further attempts at vocalization/verbalization. Vital signs remained baseline. SLP removed PMV due to patient falling asleep and PMV precautions. PMV left at roomside.    May

## 2020-11-13 NOTE — DIETARY NOTE
[facility]     NUTRITION ASSESSMENT     Pt does not meet malnutrition criteria.      NUTRITION DIAGNOSIS/PROBLEM:     Increased nutrient needs related to increased demand for healing as evidenced by pressure ulcer to sacrum - continues    Inadequate o Maintain current wt range - no new wt       MEDICATIONS:  IVF, D3, Insulin ,Prilosec, Neutra phos, Thamine     LABS:  reviewed     Pt is at moderate nutrition risk     FOLLOW-UP DATE: 11/18     Nitin Morgan RD, LDN  Clinical Dietitian  Pager - [phone]

## 2020-11-14 VITALS
HEART RATE: 99 BPM | OXYGEN SATURATION: 100 % | WEIGHT: 188.88 LBS | HEIGHT: 71 IN | RESPIRATION RATE: 16 BRPM | BODY MASS INDEX: 26.44 KG/M2 | SYSTOLIC BLOOD PRESSURE: 101 MMHG | TEMPERATURE: 99 F | DIASTOLIC BLOOD PRESSURE: 49 MMHG

## 2020-11-14 NOTE — PROGRESS NOTES
Pulmonology    Plans noted for discharge to hospice today. Discussed w RN  Will sign off at this time.     Sylvester Michele MD, [address] [place] [address] [facility]

## 2020-11-14 NOTE — CM/SW NOTE
Spoke with [facility] representative, and requested to have intake call this writer back in regards to an outcome/current status on the hospice outcome.      [facility]  R:[phone]  L:[phone]    Addendum: Spoke with Katia, intake rep at [facility]

## 2020-11-14 NOTE — PLAN OF CARE
Problem: Safety Risk - Non-Violent Restraints  Goal: Patient will remain free from self-harm  Description: INTERVENTIONS:  - Apply the least restrictive restraint to prevent harm  - Notify patient and family of reasons restraints applied  - Assess for an oxygenation and minimize respiratory effort  - Oxygen supplementation based on oxygen saturation or ABGs  - Provide Smoking Cessation handout, if applicable  - Encourage broncho-pulmonary hygiene including cough, deep breathe, Incentive Spirometry  - Asses maintained within prescribed range  Description: INTERVENTIONS:  - Monitor Blood Glucose as ordered  - Assess for signs and symptoms of hyperglycemia and hypoglycemia  - Administer ordered medications to maintain glucose within target range  - Assess barri Progressing     Problem: Impaired Functional Mobility  Goal: Achieve highest/safest level of mobility/gait  Description: Interventions:  - Assess patient's functional ability and stability  - Promote increasing activity/tolerance for mobility and gait  - E

## 2020-11-14 NOTE — DISCHARGE SUMMARY
[facility]  Discharge Summary    Julianna Laboy Patient Status:  Inpatient    1950 MRN [id number]   [facility] 2NE-A Attending Santa Wright MD   Hosp Day # 6 PCP No primary care provider on file.      Date of Admission: 2020 with any outside imaging and records when and if available. Shante Bucker SINUSES:           Small retention cyst in the left maxillary sinus. Mild mucosal thickening paranasal sinuses. MASTOIDS:          Trace fluid in the inferior right mastoid. . SKULL: acute cervical spine fracture. Mild retrolisthesis C3 in relation to C4 is likely degenerative. There is preservation of cervical vertebral body height. There is accessory ossification posterior to the C3 through  C7 spinous processes.   Multilevel endpl femoral-popliteal DVT. 2. The calf veins were difficult to assess secondary to swelling. These are incompletely evaluated. No thrombosed segments demonstrated.  3. Left Baker's cyst.     Dictated by (CST): Steph Culp MD on 11/10/2020 at 10:01 PM     Bridgette (CST): Agusto Watts MD on 11/08/2020 at 3:22 PM       Cta Chest + Ct Abd (w) + Ct Pel (w) (cpt=71275/78651)    Result Date: 11/8/2020  PROCEDURE:  CTA CHEST + CT ABD (W) + CT PEL (W) (CPT=71275/18990)  COMPARISON:  None.   INDICATIONS:  fall  TECHNIQU distal descending and rectosigmoid colon with soft tissue stranding and some free fluid in the presacral space surrounding the rectum. There is a large sacral decubitus ulcer with skin defect.   This extends to the posterior sacrum and coccyx with suspecte skin scout    Aerobic Culture Result 4+ growth Escherichia coli (A) N/A    Aerobic Culture Result 3+ growth Klebsiella pneumoniae (A) N/A    Aerobic Culture Result 2+ growth Enterococcus species not VRE (A) N/A    Aerobic Culture Result 4+ growth Pseudomon Pseudomonas aeruginosa -  (no method available)     Cefepime >16 Resistant      Ceftazidime >16 Resistant      Ciprofloxacin <=1 Sensitive      Gentamicin 4 Sensitive      Meropenem <=1 Sensitive      Levofloxacin 0.5 Sensitive      Piperacillin + Tazobact 8.6  --  8.1* 8.1* 7.9*  --    PHOS  --  3.4  --  1.8*  --  1.5*  --    * 93  --  128* 190* 147*  --                Recent Labs   Lab 11/08/20  1223 11/11/20  0630 11/12/20  0458   ALT 18 13* 16   AST 21 13* 21   ALB 2.1* 1.6* 1.7*     --   - Meropenem <=0.25 Sensitive         Levofloxacin 1 Sensitive         Piperacillin + Tazobactam 16 Sensitive         Trimethoprim/Sulfa <=20 Sensitive       Pseudomonas aeruginosa -  (no method available)       Cefepime 16 Intermediate         Ceftazidime >1 postsurgical change. INTRACRANIAL:  There is no acute intracranial hemorrhage. There is a 4.7 x 3.0 x 3.1 cm area of decreased attenuation arising from the periventricular left temporoparietal region extending to involve the corpus callosum.   Per history Multiplanar reconstructions are generated. Dose reduction techniques were used. Dose information is transmitted to the [facility]) NRDR ([address]) which includes the Dose Index Registry.   PATIENT STATED HIST PM     Finalized by (CST): Dyana Dandy, MD on 11/08/2020 at 3:22 PM        Cta Chest + Ct Abd (w) + Ct Pel (w) (cpt=71275/05346)     Result Date: 11/8/2020  PROCEDURE:  CTA CHEST + CT ABD (W) + CT PEL (W) (CPT=71275/94940)  COMPARISON:  None.   Kenneth Torrez thickening involving the distal descending and rectosigmoid colon with soft tissue stranding and some free fluid in the presacral space surrounding the rectum. There is a large sacral decubitus ulcer with skin defect.   This extends to the posterior sacrum Units Per G Tube Daily   • Ipratropium Bromide  2 spray Nasal Q12H   • Thiamine HCl  100 mg Per G Tube Daily   • potassium & sodium phosphates  1 packet Per G Tube Daily   • aspirin  325 mg Oral Daily   • cloBAZam  5 mg Oral Nightly   • Terazosin HCl  2 mg co-morbidities Wound team and general surgery follow             Fall Reported at [facility] no acute injury, patient with baseline multiple advanced complex irreversible comorbidities prior to admit to the nursing home–  Monitor       Osteomyelitis, un surgery at  for the glioblastoma.  After surgery dtr reports he knew who the family was although had little movement on the right side. Then two days later he started with seizures.  Per dtr-He was put into medically induced coma due to status epilepticus expressed satisfaction with care at [facility] and was very thankful that I took time out to explain her in detail about the available medical facts and issues especially pertaining to internal medicine and overall picture as an attending.   At the end route nightly. Enoxaparin Sodium 40 MG/0.4ML Subcutaneous Solution  Inject 40 mg into the skin daily. hydrALAzine HCl 10 MG Oral Tab  10 mg by Per G Tube route 4 (four) times daily.       insulin glargine 100 UNIT/ML Subcutaneous Solution Pen-inject

## 2020-11-14 NOTE — PROGRESS NOTES
[facility]  Progress Note    Manuela Stanford Patient Status:  Inpatient    1950 MRN [id number]   [facility] 5NW-A Attending Humberto Galeas MD   Hosp Day # 6 PCP No primary care provider on file.          SUBJECTIVE:  Subjective:  Edmundo 13* 21   ALB 2.1* 1.6* 1.7*     --   --        Recent Labs   Lab 11/13/20  0701 11/13/20  1232 11/13/20  1806 11/14/20  0004 11/14/20  0619   PGLU 137* 183* 127* 211* 169*       Recent Labs   Lab 11/08/20  1353   BLDSMR Gram positive cocci in [facility] Sensitive      Meropenem <=1 Sensitive      Levofloxacin 0.5 Sensitive      Piperacillin + Tazobactam >64 Resistant    2.  BLOOD CULTURE     Status: None    Collection Time: 11/08/20  2:15 PM    Specimen: Blood,peripheral   Result Value Ref Range    Blood C further evaluation if clinical concern persists as well as comparison with any outside imaging and records when and if available. Nesha Band SINUSES:           Small retention cyst in the left maxillary sinus. Mild mucosal thickening paranasal sinuses.   MASTOIDS: forehead and bridge of nose. FINDINGS:  There is no CT evidence for acute cervical spine fracture. Mild retrolisthesis C3 in relation to C4 is likely degenerative. There is preservation of cervical vertebral body height.   There is accessory ossificati intravenous contrast material. Multi-planar reformatted/3-D images were created to optimize visualization of vascular anatomy. Dose reduction techniques were used.  Dose information is transmitted to the [facility]) Janny Perez [address] [place] [address] Taylor catheter in a partially decompressed urinary bladder with marked wall thickening and surrounding soft tissue stranding. Cystitis cannot be excluded. BONES:  Mild degenerative changes in the lower lumbar facets.              CONCLUSION:  No gross shauna BID   • omeprazole  20 mg Per G Tube Daily   • enoxaparin  40 mg Subcutaneous Nightly   • vancomycin  15 mg/kg Intravenous Q12H     • Dextrose-NaCl 40 mL/hr at 11/13/20 4017     acetaminophen, Alum & Mag Hydroxide-Simeth, HYDROcodone-acetaminophen, Ondanse 8/11/2020.     Palliative care for goals of care     Seizure disorder–seizure medication, neurology follow-up     DVT prophylaxis–Lovenox     IDDM with complications–NovoLog sliding scale     GERD–omeprazole     BPH–terazosin     Anemia–anemia of chronic d about DNR vs treatment with trach and PEG. They pursued treatment focus with PEG and trach. She reports he had a pressure ulcer at [facility]. He was then discharged to [place]. At [place] he did not progress and suffered rectal bleeding.  This lead him to a hospit other multi-speciality's have seen and if they agree with this recommendation    We also had discussion about patient having acute and chronic pain in I advised her on judicious use of morphine and her abdomen.   She initially insisted on giving scheduled d

## 2020-11-14 NOTE — PLAN OF CARE
Alert & oriented to self. SR/ST on tele. Monie #8 trach in place. 4L 28% fiO2 to trach mask. No current secretions. Suctioned this am by RT. Pt very anxious this am & verbalized pain; PRN ativan & morphine administered. Colostomy in place.  Chronic donato i retention  Outcome: Progressing     Problem: GASTROINTESTINAL - ADULT  Goal: Maintains or returns to baseline bowel function  Description: INTERVENTIONS:  - Assess bowel function  - Maintain adequate hydration with IV or PO as ordered and tolerated  - Eval INTERVENTIONS:  - Monitor labs and rhythm and assess patient for signs and symptoms of electrolyte imbalances  - Administer electrolyte replacement as ordered  - Monitor response to electrolyte replacements, including rhythm and repeat lab results as appro and upright for all feedings (90 degrees preferred)  - Offer food and liquids at a slow rate  - No straws  - Encourage small bites of food and small sips of liquid  - Offer pills one at a time, crush or deliver with applesauce as needed  - Discontinue feed

## 2020-11-14 NOTE — CM/SW NOTE
11/14/20 1500   Discharge disposition   Expected discharge disposition Hospice/Medi   Name of [address]   Discharge transportation [facility]     Pt will discharge to [facility] inpatient unit this ev

## 2020-11-15 NOTE — PROGRESS NOTES
Pt discharged to [facility]. Tele d/c'd. Paperwork sent w/ EMS. Right PICC left in place for abx use at facility per family request. All questions answered and education given to daughter Renuka Jackson. Pt escorted via EMS to [facility].

## 2020-11-24 NOTE — CDS QUERY
Matildesa Precise  : 1950  Acct: [de-identified]  Admit: 2020  Discharge: 2020  Clarification – Condition Associated with Device, Implant or Graft  Kiersten Perdue  Dear Doctor:  Clinical information (provided below) [email or web address]

## 2023-10-23 NOTE — CONSULTS
[facility] dosing service    Follow-up Pharmacokinetic Consult for Vancomycin Dosing     Hermila Payton is a 79year old patient who is being treated for osteomyelitis. Patient is on day 6 of Vancomycin and is currently receiving 1.25 gm IV Q 12 hours.   G Ampicillin + Sulbactam >=32 Resistant      Cefazolin 16 Resistant      Cefepime <=1 Sensitive      Ceftriaxone <=1 Sensitive      Ciprofloxacin <=0.25 Sensitive      Gentamicin <=1 Sensitive      Meropenem <=0.25 Sensitive      Levofloxacin <=0.12 Sensitiv 15-20 ug/mL. 3.  Pharmacy will need Scr daily while on Vancomycin to assess renal function. 4.  Pharmacy will follow and adjust as necessary. We appreciate the opportunity to assist in the care of this patient.     GEORGES Ford [facility]  11/13/202 Protopic Counseling: Patient may experience a mild burning sensation during topical application. Protopic is not approved in children less than 2 years of age. There have been case reports of hematologic and skin malignancies in patients using topical calcineurin inhibitors although causality is questionable.

## (undated) NOTE — IP AVS SNAPSHOT
[address]            (For Outpatient Use Only) Initial Admit Date: 11/8/2020   Inpt/Obs Admit Date: Inpt: 11/8/20 / Obs: N/A   Discharge Date:    Rosangela Tobias:  [de-identified]   MRN: [de-identified]   CSN: [id number]   CEID: [id number] Group Number:  Insurance Type:    Subscriber Name:  Subscriber :    Subscriber ID:  Pt Rel to Subscriber:    Hospital Account Financial Class: Medicare    2020

## (undated) NOTE — IP AVS SNAPSHOT
Patient Demographics     Address  [address] [place] [address] Phone  [phone] [facility])      Emergency Contact(s)     Name Relation Home Work Mobile    ROBER PLUMMER Daughter   [phone]    [facility] Daughter   [address] 4,000 Units by Per G Tube route daily. cloBAZam 10 MG Tabs  Commonly known as: ONFI  Next dose due: 11/14 9pm      5 mg nightly.  Per g-tube          Doxazosin Mesylate 2 MG Tabs  Commonly known as: CARDURA  Next dose due: 11/14 9pm      2 mg by P [id number] - MAR ACTION REPORT  (last 24 hrs)    ** SITE UNKNOWN **     Order ID Medication Name Action Time Action Reason Comments    [id number] HYDROcodone-acetaminophen ([address]) 5-325 MG per tab 1 tablet 11/14/20 0452 Given      [id number] morphINE sulfate (PF) 2 MG/ML injection 2 mg 11/14/20 0614 Given      [id number] morphINE sulfate (PF) 2 MG/ML injection 2 mg 11/14/20 0928 Given      [id number] omeprazole (PRILOSEC) 2mg/ml suspension 20 mg 11/14/20 0928 Given      [id number] red Temp  97.5 °F (36.4 °C) Filed at 11/14/2020 0924   SpO2  100 % Filed at 11/14/2020 0924      Patient's Most Recent Weight       Most Recent Value   Patient Weight  85.7 kg (188 lb 14.4 oz)         Lab Results Last 24 Hours      CREATININE, SERUM [[id number] Order Status: Completed Lab Status: Final result Updated: 11/13/20 1500    Specimen: Blood,peripheral      Blood Culture Result No Growth 5 Days    Sputum culture Once [[id number]]  (Abnormal)  (Susceptibility) Collected: 11/08/20 8190    Order Status: Com Not Specified    Ampicillin >=32  Resistant    Ampicillin + Sulbactam 16  Intermediate    Cefazolin <=4  Sensitive    Ciprofloxacin <=0.25  Sensitive    Gentamicin <=1  Sensitive    Levofloxacin 1  Sensitive    Meropenem <=0.25  Sensitive    Piperacillin Piperacillin + Tazobactam >64  Resistant                   Clostridium difficile(toxigenic)PCR Once [[id number]]  (Normal) Collected: 11/08/20 2145    Order Status: Completed Lab Status: Final result Updated: 11/08/20 2236    Specimen: Stool      C.  Diffic 59-year-old male who was recently admitted to [facility] and was assigned as a new patient to our service, was recently seen at nursing [place] for initial history and physical, he is currently admitted to [facility] for a history of unwitnessed f •  cloBAZam 10 MG Oral Tab, 5 mg nightly. Per g-tube     •  Doxazosin Mesylate 2 MG Oral Tab, 2 mg by Per G Tube route nightly. •  Enoxaparin Sodium 40 MG/0.4ML Subcutaneous Solution, Inject 40 mg into the skin daily.     •  hydrALAzine HCl 10 MG Oral Pertinent items are noted in HPI. A comprehensive 10 point review of systems was completed. Pertinent positives and negatives noted in the the HPI.    Vital Signs:  Blood pressure 106/68, pulse 110, temperature 98.9 °F (37.2 °C), temperature source Axilla Result Date: 11/8/2020  CONCLUSION:  There is no CT evidence for acute intracranial hemorrhage or depressed calvarial fracture.   Postsurgical changes of left temporoparietal craniotomy with shallow 3.5 mm chronic appearing subdural collection just deep to CONCLUSION:  No gross evidence for pulmonary embolism. Evaluation of segmental arteries is limited due to motion artifact and IV bolus. No evidence of aortic aneurysm or dissection. Discoid atelectasis/infiltrates in the left lower lobe and lingula.   Po PROCEDURE:  CT BRAIN OR HEAD (21094)  COMPARISON:  None. INDICATIONS:  fall  TECHNIQUE:  Noncontrast CT scanning is performed through the brain. Dose reduction techniques were used.  Dose information is transmitted to the [address] of Radiology CONCLUSION:  There is no CT evidence for acute intracranial hemorrhage or depressed calvarial fracture. Postsurgical changes of left temporoparietal craniotomy with shallow 3.5 mm chronic appearing subdural collection just deep to the craniotomy site.   Destinee Jensen PROCEDURE:  CT SPINE CERVICAL (CPT=72125)  COMPARISON:  None. INDICATIONS:  fall  TECHNIQUE:  Noncontrast CT scanning of the cervical spine is performed from the skull base through C7. Multiplanar reconstructions are generated.   Dose reduction techniques CONCLUSION:  Slight elevation left hemidiaphragm with slight infiltrate versus atelectasis left lung base. Postsurgical changes of median sternotomy. Heart size and pulmonary vascularity is within normal limits.   Tracheostomy tip is 4.2 cm above the patt PROCEDURE:  CTA CHEST + CT ABD (W) + CT PEL (W) SH(CPT=71275/41776)  COMPARISON:  None.   INDICATIONS:  fall  TECHNIQUE:  CT of the chest (with CTA imaging), abdomen, and pelvis were obtained post- injection of non-ionic intravenous contrast material. Multi is a large sacral decubitus ulcer with skin defect. This extends to the posterior sacrum and coccyx with suspected exposed bone. There is cortical demineralization. Osteomyelitis cannot be excluded.   PELVIC ORGANS:  Taylor catheter in a partially decompr Aerobic Smear 1+ Gram positive cocci in pairs N/A    Aerobic Smear 1+ Gram Negative Rods N/A    Aerobic Smear 1+ Gram Positive Rods N/A   2.  BLOOD CULTURE     Status: Abnormal (Preliminary result)    Collection Time: 11/08/20  1:53 PM    Specimen: Blood,p Available and radiology reviewed  All consultant notes reviewed  Discussed with nursing on floor  dvt prophylaxis reviewed  PT and/or OT  Jose Luis Yung MD  11/9/2020[SK.1]    Electronically signed by Cathleen Booth MD on 11/9/2020  1:06 PM   Attribution 59-year-old male who was recently admitted to [facility] and was assigned as a new patient to our service, was recently seen at nursing [place] for initial history and physical, he is currently admitted to [facility] for a history of unwitnessed f PROCEDURE:  CT BRAIN OR HEAD (46989)  COMPARISON:  None. INDICATIONS:  fall  TECHNIQUE:  Noncontrast CT scanning is performed through the brain. Dose reduction techniques were used.  Dose information is transmitted to the [address] of Radiology PROCEDURE:  CTA CHEST + CT ABD (W) + CT PEL (W) SH(CPT=71275/14794)  COMPARISON:  None.   INDICATIONS:  fall  TECHNIQUE:  CT of the chest (with CTA imaging), abdomen, and pelvis were obtained post- injection of non-ionic intravenous contrast material. Multi Electronically signed by Chani Vega MD on 11/9/2020  1:05 PM   Attribution Gutierrez    SK. 1 - Chani Vega MD on 11/9/2020  1:01 PM                        Consults - MD Consult Notes      Consults signed by Joceline Thornton MD at 11/9/2020  4:33 PM Brandi Samson[NS.2] is a[NS.3 79year old[NS.2] male with history of left parietal Stage IV glioblastoma s/p resection (08/11/2020) with h/o epilepsy, h/o aphasia/dysphagia/ right hemiplegia, h/o Upper extremity DVT, DM, CAD s/p CABG (6v) and PCI w/ MARY davida Procedure Laterality Date   • COLON SURGERY[NS.2]         Social History:      Marital Status:   Living Situation Prior to Hospitalization: SNF  Does Patient Live Alone: no    Cheondoism/Cultural Information:  Cheondoism Affiliation: [employer] •  lacosamide (VIMPAT) solution SOLN 200 mg, 200 mg, Gastric Tube, BID  •  levETIRAcetam (KEPPRA) tab 1,500 mg, 1,500 mg, Oral, BID  •  omeprazole (PRILOSEC) 2mg/ml suspension 20 mg, 20 mg, Per G Tube, Daily  •  glucose (DEX4) oral liquid 15 g, 15 g, Oral, TROP <0.045 11/08/2020       Albumin (g/dL)   Date Value   11/08/2020 2.1 (L)   11/04/2020 2.0 (L)   11/02/2020 1.9 (L)   10/28/2020 2.0 (L)   10/25/2020 1.6 (L)   09/16/2020 1.5 (L)[NS.2]       Imaging:[NS.1]  Ct Brain Or Head (78667)    Result Date: 11/ Vital Signs:[NS.1] /68 (BP Location: Left arm)   Pulse 110   Temp 98.9 °F (37.2 °C) (Axillary)   Resp 20   Ht 5' 11\" (1.803 m)   Wt 188 lb 14.4 oz (85.7 kg)   SpO2 100%   BMI 26.35 kg/m²[NS.2]     Weights:[NS.1] Wt Readings from Last 6 Encounters: Decubitus ulcer of sacral region, stage 4 (HCC)        Fall        Osteomyelitis, unspecified site, unspecified type [facility])      Palliative Care Recommendations/Plan:     1. Goals of care discussions. [NS.1] Awaiting phone call from daughter[NS.4]    2.  Ad Hosp Day # 6 PCP No primary care provider on file.      Date of Admission: 11/8/2020    Date of Discharge: 11/14/2020    Admitting Diagnosis: Decubitus ulcer of sacral region, stage 4 ([facility] [L89.154]  Osteomyelitis, unspecified site, unspecified type ([facility] PROCEDURE:  CT BRAIN OR HEAD (13727)  COMPARISON:  None. INDICATIONS:  fall  TECHNIQUE:  Noncontrast CT scanning is performed through the brain. Dose reduction techniques were used.  Dose information is transmitted to the [address] of Radiology PROCEDURE:  US VENOUS DOPPLER LEG BILAT - DIAG IMG (CPT=93970)  COMPARISON:  None. INDICATIONS:  hypoxia and elevated d anthony  TECHNIQUE:  Real time, grey scale, and duplex ultrasound was used to evaluate the lower extremity venous system.  B-mode two-dimen CONCLUSION:  The tracheostomy tube is in midline in good position. Overlying EKG leads are noted. The right PICC line tip is in the SVC. The heart size within normal limits. The patient is status post CABG surgery.   The vascularity appears normal. Worse PROCEDURE:  CTA CHEST + CT ABD (W) + CT PEL (W) SH(CPT=71275/59675)  COMPARISON:  None.   INDICATIONS:  fall  TECHNIQUE:  CT of the chest (with CTA imaging), abdomen, and pelvis were obtained post- injection of non-ionic intravenous contrast material. Multi Aerobic Culture Result 2+ growth Enterococcus species not VRE (A) N/A    Aerobic Culture Result 4+ growth Pseudomonas aeruginosa (A) N/A    Aerobic Smear 1+ WBCs seen N/A    Aerobic Smear 1+ Gram positive cocci in pairs N/A    Aerobic Smear 1+ Gram Negati Gentamicin 4 Sensitive      Meropenem <=1 Sensitive      Levofloxacin 0.5 Sensitive      Piperacillin + Tazobactam >64 Resistant      Reason for Admission: see below    Physical Exam: see below    Hospital Course: see below    SUBJECTIVE:  Subjective:  S Lab 11/08/20  1223 11/11/20  0630 11/12/20  0458   ALT 18 13* 16   AST 21 13* 21   ALB 2.1* 1.6* 1.7*     --   --                  Recent Labs   Lab 11/13/20  0701 11/13/20  1232 11/13/20  1806 11/14/20  0004 11/14/20  0619   PGLU 137* 183* 127* 211     Trimethoprim/Sulfa <=20 Sensitive       Pseudomonas aeruginosa -  (no method available)       Cefepime 16 Intermediate         Ceftazidime >16 Resistant         Ciprofloxacin <=1 Sensitive         Gentamicin 4 Sensitive         Meropenem <=1 Sensitive PROCEDURE:  CT BRAIN OR HEAD (11381)  COMPARISON:  None. INDICATIONS:  fall  TECHNIQUE:  Noncontrast CT scanning is performed through the brain. Dose reduction techniques were used.  Dose information is transmitted to the [address] of Radiology PROCEDURE:  CTA CHEST + CT ABD (W) + CT PEL (W) SH(CPT=71275/77518)  COMPARISON:  None.   INDICATIONS:  fall  TECHNIQUE:  CT of the chest (with CTA imaging), abdomen, and pelvis were obtained post- injection of non-ionic intravenous contrast material. Multi • potassium & sodium phosphates  1 packet Per G Tube Daily   • aspirin  325 mg Oral Daily   • cloBAZam  5 mg Oral Nightly   • Terazosin HCl  2 mg Oral Nightly   • insulin detemir  15 Units Subcutaneous Q12H   • lacosamide  200 mg Gastric Tube BID   • levET   Fall Reported at [facility] no acute injury, patient with baseline multiple advanced complex irreversible comorbidities prior to admit to the nursing home–  Monitor       Osteomyelitis, unspecified site, unspecified type ([facility]  IV antibiotics, ID foll Seems like he is an unfortunate elderly male with history of multiple advanced complex comorbidities including but not limited to brain tumor in the summer of 2020.  He elected to have surgery at [facility] for the glioblastoma.  After surgery dtr reports he knew w I had face-to-face discussion with Dr. Rema Randhawa with 2 nurses assigned to the patient at side during this discussion. Rema Randhawa explain me that multiple providers and facilities have previously offered hospice and she had declined.   At this point he was made We also had discussion about patient having acute and chronic pain in I advised her on judicious use of morphine and her abdomen.   She initially insisted on giving scheduled doses of morphine but he did verbalize understanding that too much narcotics and b metoprolol Tartrate 25 MG Oral Tab  25 mg by Per G Tube route 2 (two) times daily. omeprazole 2mg/ml Oral Suspension  40 mg by Per G Tube route daily.       acetaminophen 325 MG Oral Tab  650 mg by Per G Tube route every 6 (six) hours as needed for Delora Her No notes of this type exist for this encounter.         SLP Note - SLP Notes      SLP Note signed by YOLANDA Rosado at 11/13/2020  4:41 PM  Version 2 of 2    Author: YOLANDA Rosado Service: Rehab Author Type: Speech Pathologist    Filed: 11/13/2020 Pt received awake with eyes open, tracked to SLP. Noted Shiley #8 cuffed trach in place however cuff was already deflated. Baseline HR and SpO2 115 and 97% respectively. Trach collar 28% with 4L O2 in place as well.  Pt followed x1/5 commands within physi Patient/Family Goals: Pt unable to state    Interdisciplinary Communication: Discussed with RN      FOLLOW UP[LK.1]   Re-attempt PMV trials Pending plan of care[LK. 3]    PPE worn by SLP during this visit: eye goggles, surgical mask and gloves  Patient was Order received for patient evaluation for Passy [place] Valve. History obtained from [facility]. Patient is 79year old male with PMH stage 3 rectal cancer '09 s/p resection, chemo, radiation, CAD s/p CABG, DM, HTN, anxiety, falls.  Patient underwent Interdisciplinary Communication: Discussed with RN      FOLLOW UP[LK.1]  Treatment Plan/Recommendations: No further inpatient SLP service warranted[LK.2] due to pending hospice[LK. 1]      Follow Up Needed: [address] [place] [address]. 2]       PPE worn by SLP during this visit: